# Patient Record
Sex: MALE | Race: WHITE | NOT HISPANIC OR LATINO | Employment: OTHER | URBAN - METROPOLITAN AREA
[De-identification: names, ages, dates, MRNs, and addresses within clinical notes are randomized per-mention and may not be internally consistent; named-entity substitution may affect disease eponyms.]

---

## 2018-12-26 ENCOUNTER — HOSPITAL ENCOUNTER (EMERGENCY)
Facility: CLINIC | Age: 83
Discharge: HOME | End: 2018-12-27
Attending: EMERGENCY MEDICINE | Admitting: EMERGENCY MEDICINE
Payer: MEDICARE

## 2018-12-26 DIAGNOSIS — R00.0 TACHYCARDIA: ICD-10-CM

## 2018-12-26 DIAGNOSIS — R68.83 CHILLS (WITHOUT FEVER): ICD-10-CM

## 2018-12-26 LAB — INTERPRETATION ECG - MUSE: NORMAL

## 2018-12-26 PROCEDURE — 96360 HYDRATION IV INFUSION INIT: CPT

## 2018-12-26 PROCEDURE — 93005 ELECTROCARDIOGRAM TRACING: CPT

## 2018-12-26 PROCEDURE — 99285 EMERGENCY DEPT VISIT HI MDM: CPT | Mod: 25

## 2018-12-26 ASSESSMENT — MIFFLIN-ST. JEOR: SCORE: 1492.32

## 2018-12-26 NOTE — ED AVS SNAPSHOT
Emergency Department  [address] [facility] [address]  Phone:  [phone]  Fax:  [phone]                                    Hernandez Hanson   MRN: [id number]    Department:   Emergency Department   Date of Visit:  12/26/2018           After Visit Summary Signature Page    I have received my discharge instructions, and my questions have been answered. I have discussed any challenges I see with this plan with the nurse or doctor.    ..........................................................................................................................................  Patient/Patient Representative Signature      ..........................................................................................................................................  Patient Representative Print Name and Relationship to Patient    ..................................................               ................................................  Date                                   Time    ..........................................................................................................................................  Reviewed by Signature/Title    ...................................................              ..............................................  Date                                               Time          22EPIC Rev 08/18

## 2018-12-27 ENCOUNTER — APPOINTMENT (OUTPATIENT)
Dept: GENERAL RADIOLOGY | Facility: CLINIC | Age: 83
End: 2018-12-27
Attending: EMERGENCY MEDICINE
Payer: MEDICARE

## 2018-12-27 VITALS
HEART RATE: 104 BPM | RESPIRATION RATE: 13 BRPM | SYSTOLIC BLOOD PRESSURE: 153 MMHG | TEMPERATURE: 98.8 F | HEIGHT: 67 IN | OXYGEN SATURATION: 94 % | BODY MASS INDEX: 29.19 KG/M2 | WEIGHT: 186 LBS | DIASTOLIC BLOOD PRESSURE: 89 MMHG

## 2018-12-27 LAB
ALBUMIN SERPL-MCNC: 3.5 G/DL (ref 3.4–5)
ALP SERPL-CCNC: 101 U/L (ref 40–150)
ALT SERPL W P-5'-P-CCNC: 26 U/L (ref 0–70)
ANION GAP SERPL CALCULATED.3IONS-SCNC: 9 MMOL/L (ref 3–14)
AST SERPL W P-5'-P-CCNC: 30 U/L (ref 0–45)
BASOPHILS # BLD AUTO: 0 10E9/L (ref 0–0.2)
BASOPHILS NFR BLD AUTO: 0.3 %
BILIRUB SERPL-MCNC: 1.4 MG/DL (ref 0.2–1.3)
BUN SERPL-MCNC: 11 MG/DL (ref 7–30)
CALCIUM SERPL-MCNC: 8.5 MG/DL (ref 8.5–10.1)
CHLORIDE SERPL-SCNC: 103 MMOL/L (ref 94–109)
CO2 SERPL-SCNC: 25 MMOL/L (ref 20–32)
CREAT SERPL-MCNC: 0.93 MG/DL (ref 0.66–1.25)
DIFFERENTIAL METHOD BLD: ABNORMAL
EOSINOPHIL # BLD AUTO: 0.1 10E9/L (ref 0–0.7)
EOSINOPHIL NFR BLD AUTO: 1.1 %
ERYTHROCYTE [DISTWIDTH] IN BLOOD BY AUTOMATED COUNT: 13.4 % (ref 10–15)
GFR SERPL CREATININE-BSD FRML MDRD: 75 ML/MIN/{1.73_M2}
GLUCOSE SERPL-MCNC: 98 MG/DL (ref 70–99)
HCT VFR BLD AUTO: 40.6 % (ref 40–53)
HGB BLD-MCNC: 13.9 G/DL (ref 13.3–17.7)
IMM GRANULOCYTES # BLD: 0 10E9/L (ref 0–0.4)
IMM GRANULOCYTES NFR BLD: 0.3 %
INR PPP: 1.72 (ref 0.86–1.14)
LACTATE BLD-SCNC: 0.9 MMOL/L (ref 0.7–2)
LYMPHOCYTES # BLD AUTO: 1 10E9/L (ref 0.8–5.3)
LYMPHOCYTES NFR BLD AUTO: 10.1 %
MCH RBC QN AUTO: 31.7 PG (ref 26.5–33)
MCHC RBC AUTO-ENTMCNC: 34.2 G/DL (ref 31.5–36.5)
MCV RBC AUTO: 93 FL (ref 78–100)
MONOCYTES # BLD AUTO: 0.8 10E9/L (ref 0–1.3)
MONOCYTES NFR BLD AUTO: 8.2 %
NEUTROPHILS # BLD AUTO: 7.5 10E9/L (ref 1.6–8.3)
NEUTROPHILS NFR BLD AUTO: 80 %
NRBC # BLD AUTO: 0 10*3/UL
NRBC BLD AUTO-RTO: 0 /100
PLATELET # BLD AUTO: 162 10E9/L (ref 150–450)
POTASSIUM SERPL-SCNC: 4.1 MMOL/L (ref 3.4–5.3)
PROT SERPL-MCNC: 7.2 G/DL (ref 6.8–8.8)
RBC # BLD AUTO: 4.39 10E12/L (ref 4.4–5.9)
SODIUM SERPL-SCNC: 137 MMOL/L (ref 133–144)
TROPONIN I SERPL-MCNC: <0.015 UG/L (ref 0–0.04)
TROPONIN I SERPL-MCNC: <0.015 UG/L (ref 0–0.04)
WBC # BLD AUTO: 9.4 10E9/L (ref 4–11)

## 2018-12-27 PROCEDURE — 71046 X-RAY EXAM CHEST 2 VIEWS: CPT

## 2018-12-27 PROCEDURE — 85610 PROTHROMBIN TIME: CPT | Performed by: EMERGENCY MEDICINE

## 2018-12-27 PROCEDURE — 83605 ASSAY OF LACTIC ACID: CPT | Performed by: EMERGENCY MEDICINE

## 2018-12-27 PROCEDURE — 80053 COMPREHEN METABOLIC PANEL: CPT | Performed by: EMERGENCY MEDICINE

## 2018-12-27 PROCEDURE — A9270 NON-COVERED ITEM OR SERVICE: HCPCS | Mod: GY | Performed by: EMERGENCY MEDICINE

## 2018-12-27 PROCEDURE — 84484 ASSAY OF TROPONIN QUANT: CPT | Performed by: EMERGENCY MEDICINE

## 2018-12-27 PROCEDURE — 25000128 H RX IP 250 OP 636: Performed by: EMERGENCY MEDICINE

## 2018-12-27 PROCEDURE — 96360 HYDRATION IV INFUSION INIT: CPT

## 2018-12-27 PROCEDURE — 85025 COMPLETE CBC W/AUTO DIFF WBC: CPT | Performed by: EMERGENCY MEDICINE

## 2018-12-27 PROCEDURE — 25000132 ZZH RX MED GY IP 250 OP 250 PS 637: Performed by: EMERGENCY MEDICINE

## 2018-12-27 RX ORDER — METOPROLOL TARTRATE 25 MG/1
25 TABLET, FILM COATED ORAL ONCE
Status: COMPLETED | OUTPATIENT
Start: 2018-12-27 | End: 2018-12-27

## 2018-12-27 RX ADMIN — SODIUM CHLORIDE 1000 ML: 9 INJECTION, SOLUTION INTRAVENOUS at 00:28

## 2018-12-27 RX ADMIN — METOPROLOL TARTRATE 25 MG: 25 TABLET ORAL at 01:44

## 2018-12-27 ASSESSMENT — ENCOUNTER SYMPTOMS
PALPITATIONS: 1
SHORTNESS OF BREATH: 0
FEVER: 0
COUGH: 1

## 2018-12-27 NOTE — ED PROVIDER NOTES
History     Chief Complaint:  Tachycardia     HPI   Hernandez Hanson is a 84 year old male with a history of atrial fibrillation who presents with tachycardia. The patient had no fever but has had a cough for the past couple days and decided to visit his physician this morning for evaluation and treatment. Ultimately he received a change in blood pressure medication due to a cough being a side effect. This evening, the patient started experiencing chills and noticed on his home monitor that his heart rate was in the 130s. As a result, the patient and his wide and daughter have visited the ED for evlauation and treatment. Upon arrival, the patient notes that he feels okay and that his heart rate is normal in the 60s. He denies any chest pain or shortness of breath. Of note: he is currently taking warfarin daily and is no longer on Digoxin. He has also been eating and drinking fluids normally. He has not had any tylenol or ibuprofen.    CARDIAC RISK FACTORS:  Sex:    Male   Tobacco:   Negative   Hypertension:   Positive   Hyperlipidemia:  Negative   Diabetes:   Negative   Family History:  Negative     PE/DVT RISK FACTORS:  Sex:    Male   Hormones:   Negative   Tobacco:   Negative   Cancer:   Positive (prostate cancer)  Travel:   Negative   Surgery:   Negative   Other immobilization: Negative   Personal history:  Negative   Family history:  Negative     Allergies:  The patient has no known drug allergies.    Medications:    lanocin  norco  xaltan  Benemid  flomax  coumadin    Past Medical History:    antiplatelet long term use  arrhythmia  Arthritis  Gout  Prostate cancer  Hypertension  chronic pain  Renal disease    Past Surgical History:    colonoscopy  Cystoscopy  genitourinary surgery  Laser holmium lithotripsy ureters  Orthopedic surgery     Family History:    No past pertinent family history.    Social History:  Marital Status:    Smoker:   Never   Smokeless:   Never   Alcohol:   Yes rare  "  Drugs:   No   Lives at:   Unknown   Arrives with:   Wife and daughter   Clinic:    Unknown   Work:   Unknown     Review of Systems   Constitutional: Negative for fever.   Respiratory: Positive for cough. Negative for shortness of breath.    Cardiovascular: Positive for palpitations. Negative for chest pain.   All other systems reviewed and are negative.    Physical Exam     Patient Vitals for the past 24 hrs:   BP Temp Temp src Pulse Heart Rate Resp SpO2 Height Weight   12/27/18 0302 -- -- -- -- 86 13 94 % -- --   12/27/18 0239 -- -- -- -- 87 15 93 % -- --   12/27/18 0232 -- -- -- -- 89 12 95 % -- --   12/27/18 0211 -- -- -- -- 96 21 94 % -- --   12/27/18 0202 -- -- -- -- 101 14 91 % -- --   12/27/18 0147 -- -- -- -- 93 20 -- -- --   12/27/18 0143 153/89 -- -- 104 104 24 93 % -- --   12/27/18 0110 (!) 163/95 -- -- 116 -- -- 95 % -- --   12/27/18 0045 -- -- -- -- 104 13 96 % -- --   12/27/18 0030 (!) 156/99 -- -- -- 106 20 94 % -- --   12/27/18 0015 -- -- -- -- 106 20 95 % -- --   12/27/18 0000 161/89 -- -- 100 106 26 93 % -- --   12/26/18 2314 (!) 165/96 98.8  F (37.1  C) Oral -- 116 18 96 % 1.702 m (5' 7\") 84.4 kg (186 lb)     Physical Exam  General: Appears well-developed and well-nourished.   Head: No signs of trauma.   CV: Mild tachycardia, irregularly irregular.    Resp: Effort normal and breath sounds normal. No respiratory distress.   GI: Soft. There is no tenderness.  No rebound or guarding.  Normal bowel sounds.  No CVA tenderness.  MSK: Normal range of motion. no edema. No Calf tenderness.  Neuro: The patient is alert and oriented.  Strength in upper/lower extremities normal and symmetrical.   Sensation normal. Speech normal.  GCS 15  Skin: Skin is warm and dry. No rash noted.   Psych: normal mood and affect. behavior is normal.       Emergency Department Course   ECG:  Indication: tachycardia   Time: 2314  Vent. Rate 108 bpm. AL interval *. QRS duration 92. QT/QTc 310/415. P-R-T axis * 60 33. Atrial " fibrillation. Incomplete right bundle branch block. Borderline ECG. Read time: 2350    Imaging:  Radiographic findings were communicated with the patient who voiced understanding of the findings.    XR Chest PA & LAT:   The heart size is normal. There are a few small linear  metallic foreign bodies projecting over the chest. The lungs are  clear. No pneumothorax or pleural effusion. Degenerative disease in  the thoracic spine. as per radiology.     Laboratory:  INR: 1.72  0024 Lactic acid: 0.9  CMP: bilirubin 1.4, o/w WNL (Creatinine: 0.93)  CBC: WBC: 9.4, HGB: 13.9, PLT: 162  0150 Troponin: <0.015    Interventions:  0116: NS 1L IV Bolus   0144: Metoprolol 25 mg IV     Emergency Department Course:  Nursing notes and vitals reviewed. (6962) I performed an exam of the patient as documented above.     IV inserted. Medicine administered as documented above. Blood drawn. This was sent to the lab for further testing, results above.    The patient was sent for a XR chest while in the emergency department, findings above.     (4911) I rechecked the patient and discussed the results of his workup thus far.     Findings and plan explained to the Patient and spouse and daughter. Patient discharged home with instructions regarding supportive care, medications, and reasons to return. The importance of close follow-up was reviewed.    I personally reviewed the laboratory results with the Patient and spouse and daughter and answered all related questions prior to discharge.    Impression & Plan    Medical Decision Making:  Enzo Hanson is an 84-year-old gentleman who presents due to chills and tachycardia.  Apparently he was having some chills this evening although had no other acute complaints.  He has been having a chronic cough over the last couple of months which is unchanged but no congestion, sputum, or other symptoms.  He had checked his vital signs and noted that his heart rate was elevated which made him concerned and he  came to the ER.  At the time of my evaluation, the patient was symptom-free and stated that the chills had resolved and had no other complaints.  The patient does have a history of atrial fibrillation and in the ER his heart rate was right around 100.  He had not taken his evening dose of metoprolol so I did give this.  Blood work was obtained that was non-concerning with no elevation of his white blood cell count and lactic acid.  Troponin was obtained that was negative and repeat troponin continue to be negative as well.  The patient's heart rate did normalize into the 80s which is where he has been on his last couple of clinic visits.  Given the patient being symptom-free with a negative workup, I feel it is appropriate for discharge home and outpatient management.  It is unclear what the chills were from this evening. The patient was instructed to continue supportive care measures and to follow with his primary care doctor and return for any worsening symptoms or further concerns.    Diagnosis:    ICD-10-CM    1. Chills (without fever) R68.83    2. Tachycardia R00.0        Disposition:  discharged to home    Scribe Disclosure:  I, Kin Medina, am serving as a scribe on 12/26/2018 at 11:52 PM to personally document services performed by Jayy Echeverria MD based on my observations and the provider's statements to me.     Kin Medina  12/26/2018    EMERGENCY DEPARTMENT       Jayy Echeverria MD  12/27/18 0701

## 2021-03-21 ENCOUNTER — HEALTH MAINTENANCE LETTER (OUTPATIENT)
Age: 86
End: 2021-03-21

## 2021-09-04 ENCOUNTER — HEALTH MAINTENANCE LETTER (OUTPATIENT)
Age: 86
End: 2021-09-04

## 2022-04-16 ENCOUNTER — HEALTH MAINTENANCE LETTER (OUTPATIENT)
Age: 87
End: 2022-04-16

## 2022-10-22 ENCOUNTER — HEALTH MAINTENANCE LETTER (OUTPATIENT)
Age: 87
End: 2022-10-22

## 2022-11-26 ENCOUNTER — HOSPITAL ENCOUNTER (EMERGENCY)
Facility: CLINIC | Age: 87
Discharge: HOME | End: 2022-11-26
Attending: EMERGENCY MEDICINE | Admitting: EMERGENCY MEDICINE
Payer: COMMERCIAL

## 2022-11-26 VITALS
SYSTOLIC BLOOD PRESSURE: 164 MMHG | HEIGHT: 67 IN | DIASTOLIC BLOOD PRESSURE: 88 MMHG | WEIGHT: 170 LBS | HEART RATE: 96 BPM | RESPIRATION RATE: 18 BRPM | OXYGEN SATURATION: 98 % | TEMPERATURE: 97.8 F | BODY MASS INDEX: 26.68 KG/M2

## 2022-11-26 DIAGNOSIS — R79.1 SUPRATHERAPEUTIC INR: ICD-10-CM

## 2022-11-26 DIAGNOSIS — N39.0 URINARY TRACT INFECTION WITHOUT HEMATURIA, SITE UNSPECIFIED: ICD-10-CM

## 2022-11-26 DIAGNOSIS — E86.0 DEHYDRATION: ICD-10-CM

## 2022-11-26 DIAGNOSIS — Z87.898 HISTORY OF DIARRHEA: ICD-10-CM

## 2022-11-26 LAB
ALBUMIN SERPL-MCNC: 2.9 G/DL (ref 3.4–5)
ALBUMIN UR-MCNC: 50 MG/DL
ALP SERPL-CCNC: 102 U/L (ref 40–150)
ALT SERPL W P-5'-P-CCNC: 21 U/L (ref 0–70)
ANION GAP SERPL CALCULATED.3IONS-SCNC: 7 MMOL/L (ref 3–14)
APPEARANCE UR: ABNORMAL
AST SERPL W P-5'-P-CCNC: 15 U/L (ref 0–45)
ATRIAL RATE - MUSE: NORMAL BPM
BACTERIA #/AREA URNS HPF: ABNORMAL /HPF
BASOPHILS # BLD AUTO: 0 10E3/UL (ref 0–0.2)
BASOPHILS NFR BLD AUTO: 0 %
BILIRUB SERPL-MCNC: 1.2 MG/DL (ref 0.2–1.3)
BILIRUB UR QL STRIP: NEGATIVE
BUN SERPL-MCNC: 21 MG/DL (ref 7–30)
CALCIUM SERPL-MCNC: 8.9 MG/DL (ref 8.5–10.1)
CHLORIDE BLD-SCNC: 105 MMOL/L (ref 94–109)
CO2 SERPL-SCNC: 27 MMOL/L (ref 20–32)
COLOR UR AUTO: YELLOW
CREAT SERPL-MCNC: 0.89 MG/DL (ref 0.66–1.25)
DIASTOLIC BLOOD PRESSURE - MUSE: NORMAL MMHG
EOSINOPHIL # BLD AUTO: 0 10E3/UL (ref 0–0.7)
EOSINOPHIL NFR BLD AUTO: 0 %
ERYTHROCYTE [DISTWIDTH] IN BLOOD BY AUTOMATED COUNT: 13.2 % (ref 10–15)
GFR SERPL CREATININE-BSD FRML MDRD: 82 ML/MIN/1.73M2
GLUCOSE BLD-MCNC: 88 MG/DL (ref 70–99)
GLUCOSE UR STRIP-MCNC: NEGATIVE MG/DL
HCT VFR BLD AUTO: 38.2 % (ref 40–53)
HGB BLD-MCNC: 12.9 G/DL (ref 13.3–17.7)
HGB UR QL STRIP: NEGATIVE
HYALINE CASTS: 4 /LPF
IMM GRANULOCYTES # BLD: 0.1 10E3/UL
IMM GRANULOCYTES NFR BLD: 1 %
INR PPP: 3.45 (ref 0.85–1.15)
INTERPRETATION ECG - MUSE: NORMAL
KETONES UR STRIP-MCNC: 40 MG/DL
LEUKOCYTE ESTERASE UR QL STRIP: ABNORMAL
LYMPHOCYTES # BLD AUTO: 1.2 10E3/UL (ref 0.8–5.3)
LYMPHOCYTES NFR BLD AUTO: 7 %
MCH RBC QN AUTO: 32 PG (ref 26.5–33)
MCHC RBC AUTO-ENTMCNC: 33.8 G/DL (ref 31.5–36.5)
MCV RBC AUTO: 95 FL (ref 78–100)
MONOCYTES # BLD AUTO: 1 10E3/UL (ref 0–1.3)
MONOCYTES NFR BLD AUTO: 6 %
MUCOUS THREADS #/AREA URNS LPF: PRESENT /LPF
NEUTROPHILS # BLD AUTO: 13.4 10E3/UL (ref 1.6–8.3)
NEUTROPHILS NFR BLD AUTO: 86 %
NITRATE UR QL: POSITIVE
NRBC # BLD AUTO: 0 10E3/UL
NRBC BLD AUTO-RTO: 0 /100
P AXIS - MUSE: NORMAL DEGREES
PH UR STRIP: 5.5 [PH] (ref 5–7)
PLATELET # BLD AUTO: 192 10E3/UL (ref 150–450)
POTASSIUM BLD-SCNC: 4 MMOL/L (ref 3.4–5.3)
PR INTERVAL - MUSE: NORMAL MS
PROT SERPL-MCNC: 7 G/DL (ref 6.8–8.8)
QRS DURATION - MUSE: 100 MS
QT - MUSE: 400 MS
QTC - MUSE: 456 MS
R AXIS - MUSE: 61 DEGREES
RBC # BLD AUTO: 4.03 10E6/UL (ref 4.4–5.9)
RBC URINE: 3 /HPF
SODIUM SERPL-SCNC: 139 MMOL/L (ref 133–144)
SP GR UR STRIP: 1.02 (ref 1–1.03)
SYSTOLIC BLOOD PRESSURE - MUSE: NORMAL MMHG
T AXIS - MUSE: 27 DEGREES
TROPONIN I SERPL HS-MCNC: 21 NG/L
UROBILINOGEN UR STRIP-MCNC: NORMAL MG/DL
VENTRICULAR RATE- MUSE: 78 BPM
WBC # BLD AUTO: 15.7 10E3/UL (ref 4–11)
WBC CLUMPS #/AREA URNS HPF: PRESENT /HPF
WBC URINE: >182 /HPF

## 2022-11-26 PROCEDURE — 36415 COLL VENOUS BLD VENIPUNCTURE: CPT | Performed by: EMERGENCY MEDICINE

## 2022-11-26 PROCEDURE — 93005 ELECTROCARDIOGRAM TRACING: CPT

## 2022-11-26 PROCEDURE — 81001 URINALYSIS AUTO W/SCOPE: CPT | Performed by: EMERGENCY MEDICINE

## 2022-11-26 PROCEDURE — 258N000003 HC RX IP 258 OP 636: Performed by: EMERGENCY MEDICINE

## 2022-11-26 PROCEDURE — 96361 HYDRATE IV INFUSION ADD-ON: CPT

## 2022-11-26 PROCEDURE — 80053 COMPREHEN METABOLIC PANEL: CPT | Performed by: EMERGENCY MEDICINE

## 2022-11-26 PROCEDURE — 84484 ASSAY OF TROPONIN QUANT: CPT | Performed by: EMERGENCY MEDICINE

## 2022-11-26 PROCEDURE — 250N000011 HC RX IP 250 OP 636: Performed by: EMERGENCY MEDICINE

## 2022-11-26 PROCEDURE — 85025 COMPLETE CBC W/AUTO DIFF WBC: CPT | Performed by: EMERGENCY MEDICINE

## 2022-11-26 PROCEDURE — 85610 PROTHROMBIN TIME: CPT | Performed by: EMERGENCY MEDICINE

## 2022-11-26 PROCEDURE — 96365 THER/PROPH/DIAG IV INF INIT: CPT

## 2022-11-26 PROCEDURE — 99284 EMERGENCY DEPT VISIT MOD MDM: CPT | Mod: 25

## 2022-11-26 PROCEDURE — 87086 URINE CULTURE/COLONY COUNT: CPT | Performed by: EMERGENCY MEDICINE

## 2022-11-26 RX ORDER — CEPHALEXIN 500 MG/1
500 CAPSULE ORAL 3 TIMES DAILY
Qty: 21 CAPSULE | Refills: 0 | Status: SHIPPED | OUTPATIENT
Start: 2022-11-26 | End: 2022-12-03

## 2022-11-26 RX ORDER — CEFTRIAXONE 1 G/1
1 INJECTION, POWDER, FOR SOLUTION INTRAMUSCULAR; INTRAVENOUS ONCE
Status: COMPLETED | OUTPATIENT
Start: 2022-11-26 | End: 2022-11-26

## 2022-11-26 RX ADMIN — SODIUM CHLORIDE 1000 ML: 9 INJECTION, SOLUTION INTRAVENOUS at 17:43

## 2022-11-26 RX ADMIN — SODIUM CHLORIDE 500 ML: 9 INJECTION, SOLUTION INTRAVENOUS at 12:51

## 2022-11-26 RX ADMIN — CEFTRIAXONE SODIUM 1 G: 1 INJECTION, POWDER, FOR SOLUTION INTRAMUSCULAR; INTRAVENOUS at 17:43

## 2022-11-26 ASSESSMENT — ACTIVITIES OF DAILY LIVING (ADL)
ADLS_ACUITY_SCORE: 33
ADLS_ACUITY_SCORE: 35

## 2022-11-26 NOTE — ED TRIAGE NOTES
Reports a couple days of loose stools and urinary incontinence.  States burning when pees.  Feels dehydrated.  On coumadin.      Triage Assessment     Row Name 11/26/22 1238       Triage Assessment (Adult)    Airway WDL WDL       Respiratory WDL    Respiratory WDL WDL       Skin Circulation/Temperature WDL    Skin Circulation/Temperature WDL WDL       Cardiac WDL    Cardiac WDL WDL       Peripheral/Neurovascular WDL    Peripheral Neurovascular WDL WDL       Cognitive/Neuro/Behavioral WDL    Cognitive/Neuro/Behavioral WDL WDL

## 2022-11-26 NOTE — ED PROVIDER NOTES
History   Chief Complaint:  Diarrhea         HPI   Hernandez Hanson is a 88 year old male who presents for evaluation of 2-day history of loose stools and urinary incontinence.  Patient states that he is having burning with urination.  He feels like he is dehydrated.  He denies nausea vomiting but has experienced diarrhea.  The diarrhea is accompanied by incontinence.  He denies fever or chills.  He denies abdominal pain.  He denies chest pain or shortness of breath.    Allergies:  No Known Allergies    Medications:   Digoxin (LANOXIN PO)  HYDROcodone-acetaminophen (NORCO) 5-325 MG per tablet  latanoprost (XALATAN) 0.005 % ophthalmic solution  METOPROLOL TARTRATE PO  probenecid (BENEMID) 500 MG tablet  tamsulosin (FLOMAX) 0.4 MG 24 hr capsule  Warfarin Sodium (COUMADIN PO)        Past Medical History:    Past Medical History:   Diagnosis Date     Antiplatelet or antithrombotic long-term use      Arrhythmia      Arthritis      Gout      H/O prostate cancer      Hypertension      Other chronic pain      Renal disease      There is no problem list on file for this patient.       Past Surgical History:    Past Surgical History:   Procedure Laterality Date     COLONOSCOPY       CYSTOSCOPY, RETROGRADES, COMBINED  4/15/2014    Procedure: COMBINED CYSTOSCOPY, RETROGRADES;  Surgeon: Josue Greene MD;  Location:  OR     GENITOURINARY SURGERY      BRACHY THERAPY     LASER HOLMIUM LITHOTRIPSY URETER(S), INSERT STENT, COMBINED  4/15/2014    Procedure: CYSTOSCOPY, LEFT RETROGRADE, LEFT URETEROSCOPY, HOLMUIM LASER LITHOTRIPSY, LEFT STENT, URETHRAL STRICTURE DILATION;  Surgeon: Josue Greene MD;  Location:  OR     ORTHOPEDIC SURGERY      R SHAHIDA        Family History:    Noncontributory    Social History:  PCP: EL GILMORE  Presents to the ED with his wife  Social History     Tobacco Use     Smoking status: Never     Smokeless tobacco: Never   Substance Use Topics     Alcohol use: Yes     Comment: rare     Drug  "use: No       Review of Systems  See the HPI, otherwise the rest of the ROS is negative.      Physical Exam     Patient Vitals for the past 24 hrs:   BP Temp Temp src Pulse Resp SpO2 Height Weight   11/26/22 1900 (!) 164/88 -- -- 96 -- -- -- --   11/26/22 1815 (!) 156/97 -- -- 89 -- -- -- --   11/26/22 1800 (!) 144/77 -- -- 86 -- -- -- --   11/26/22 1745 (!) 148/97 -- -- 90 -- -- -- --   11/26/22 1740 (!) 144/81 -- -- 90 -- -- -- --   11/26/22 1238 (!) 145/66 97.8  F (36.6  C) Temporal 73 18 98 % 1.702 m (5' 7\") 77.1 kg (170 lb)       Physical Exam  General: Alert, No distress. Nontoxic appearance  Head: No signs of trauma.   Mouth/Throat: Oropharynx moist.   Eyes: Conjunctivae are normal. Pupils are equal..   Neck: Normal range of motion.    CV: Appears well perfused.  Resp:No respiratory distress.  Abdomen: soft nontender nondistended  MSK: Normal range of motion. No obvious deformity.   Neuro: The patient is alert and interactive. SANDERSON. Speech normal. GCS 15  Skin: No lesion or sign of trauma noted.   Psych: normal mood and affect. behavior is normal.       Emergency Department Course     ECG  ECG taken at 1254, ECG read at 1714  Atrial fibrillation   Incomplete right bundle branch block   Abnormal ECG  Rate 78 bpm. UT interval * ms. QRS duration 100 ms. QT/QTc 400/456 ms. P-R-T axes * 61 27.     Imaging:  No orders to display      Laboratory:  Labs Ordered and Resulted from Time of ED Arrival to Time of ED Departure   COMPREHENSIVE METABOLIC PANEL - Abnormal       Result Value    Sodium 139      Potassium 4.0      Chloride 105      Carbon Dioxide (CO2) 27      Anion Gap 7      Urea Nitrogen 21      Creatinine 0.89      Calcium 8.9      Glucose 88      Alkaline Phosphatase 102      AST 15      ALT 21      Protein Total 7.0      Albumin 2.9 (*)     Bilirubin Total 1.2      GFR Estimate 82     INR - Abnormal    INR 3.45 (*)    UA MACROSCOPIC WITH REFLEX TO MICRO AND CULTURE - Abnormal    Color Urine Yellow      " Appearance Urine Slightly Cloudy (*)     Glucose Urine Negative      Bilirubin Urine Negative      Ketones Urine 40 (*)     Specific Gravity Urine 1.022      Blood Urine Negative      pH Urine 5.5      Protein Albumin Urine 50 (*)     Urobilinogen Urine Normal      Nitrite Urine Positive (*)     Leukocyte Esterase Urine Large (*)     Bacteria Urine Many (*)     WBC Clumps Urine Present (*)     Mucus Urine Present (*)     RBC Urine 3 (*)     WBC Urine >182 (*)     Hyaline Casts Urine 4 (*)    CBC WITH PLATELETS AND DIFFERENTIAL - Abnormal    WBC Count 15.7 (*)     RBC Count 4.03 (*)     Hemoglobin 12.9 (*)     Hematocrit 38.2 (*)     MCV 95      MCH 32.0      MCHC 33.8      RDW 13.2      Platelet Count 192      % Neutrophils 86      % Lymphocytes 7      % Monocytes 6      % Eosinophils 0      % Basophils 0      % Immature Granulocytes 1      NRBCs per 100 WBC 0      Absolute Neutrophils 13.4 (*)     Absolute Lymphocytes 1.2      Absolute Monocytes 1.0      Absolute Eosinophils 0.0      Absolute Basophils 0.0      Absolute Immature Granulocytes 0.1      Absolute NRBCs 0.0     TROPONIN I - Normal    Troponin I High Sensitivity 21     URINE CULTURE       Interventions:  Medications   0.9% sodium chloride BOLUS (0 mLs Intravenous Stopped 11/26/22 1527)   cefTRIAXone (ROCEPHIN) 1 g vial to attach to  mL bag for ADULTS or NS 50 mL bag for PEDS (0 g Intravenous Stopped 11/26/22 1908)   0.9% sodium chloride BOLUS (0 mLs Intravenous Stopped 11/26/22 1908)       Emergency Department Course/Medical Decision Making:  This patient is presenting to the ED with UTI symptoms.  Urinalysis indicates UTI.  Urine culture is pending.  The patient was given IV ceftriaxone in the ED.  He will be discharged with p.o. Keflex.  He has no signs of systemic illness except for history of recent diarrhea.  He was unable to produce a stool sample while in the ED.  He clinically appears dehydrated.  He received 1 L normal saline fluid bolus  and is feeling better.  His INR is slightly supratherapeutic.  He will need to follow-up with his primary care doctor regarding this but is not having uncontrolled bleeding.    Diagnosis:    ICD-10-CM    1. Urinary tract infection without hematuria, site unspecified  N39.0       2. Dehydration  E86.0       3. History of diarrhea  Z87.898       4. Supratherapeutic INR  R79.1           Disposition:  Discharged to home.    Discharge Medications:  Discharge Medication List as of 11/26/2022  7:08 PM      START taking these medications    Details   cephALEXin (KEFLEX) 500 MG capsule Take 1 capsule (500 mg) by mouth 3 times daily for 7 days, Disp-21 capsule, R-0, Local Print              Bari Boateng MD  11/27/22 5909

## 2022-11-27 LAB — BACTERIA UR CULT: ABNORMAL

## 2022-11-28 NOTE — RESULT ENCOUNTER NOTE
Final Urine Culture Report on 11/27  [facility] Emergency Dept discharge antibiotic prescribed: Cephalexin (Keflex) 500 mg capsule, 1 capsule (500 mg) by mouth 3 times daily for 7 days.  #1. Bacteria, >100,000 CFU/ML Escherichia coli , is SUSCEPTIBLE to Antibiotic.    No change in treatment per [facility] ED lab result Urine Culture protocol.

## 2022-12-05 ENCOUNTER — HOSPITAL ENCOUNTER (EMERGENCY)
Facility: CLINIC | Age: 87
Discharge: HOME | End: 2022-12-05
Attending: EMERGENCY MEDICINE | Admitting: EMERGENCY MEDICINE
Payer: COMMERCIAL

## 2022-12-05 VITALS
HEIGHT: 67 IN | HEART RATE: 78 BPM | SYSTOLIC BLOOD PRESSURE: 184 MMHG | BODY MASS INDEX: 27.4 KG/M2 | DIASTOLIC BLOOD PRESSURE: 88 MMHG | WEIGHT: 174.6 LBS | RESPIRATION RATE: 16 BRPM | TEMPERATURE: 97.7 F | OXYGEN SATURATION: 96 %

## 2022-12-05 DIAGNOSIS — I10 HYPERTENSION, UNSPECIFIED TYPE: ICD-10-CM

## 2022-12-05 PROCEDURE — 93005 ELECTROCARDIOGRAM TRACING: CPT

## 2022-12-05 PROCEDURE — 99283 EMERGENCY DEPT VISIT LOW MDM: CPT

## 2022-12-05 RX ORDER — HYDRALAZINE HYDROCHLORIDE 25 MG/1
25 TABLET, FILM COATED ORAL 3 TIMES DAILY
Refills: 0
Start: 2022-12-05

## 2022-12-06 NOTE — ED PROVIDER NOTES
History     Chief Complaint:  Hypertension       HPI   Hernandez Hanson is a 88 year old male who presents with concerns over high blood pressure.  Was seen recently diagnosed with a UTI, had his antibiotic changed to ciprofloxacin due to some fluctuations in his INR.  He has been checking his blood pressure more lately and noticing numbers greater than 180 systolic.  Very concerned by that.  Denies fever headache chest pain or shortness of breath symptoms..    ROS:  Review of Systems   All other systems reviewed and are negative.         Allergies:  No Known Allergies     Medications:    hydrALAZINE (APRESOLINE) 25 MG tablet  Digoxin (LANOXIN PO)  HYDROcodone-acetaminophen (NORCO) 5-325 MG per tablet  latanoprost (XALATAN) 0.005 % ophthalmic solution  METOPROLOL TARTRATE PO  probenecid (BENEMID) 500 MG tablet  tamsulosin (FLOMAX) 0.4 MG 24 hr capsule  Warfarin Sodium (COUMADIN PO)        Past Medical History:    Past Medical History:   Diagnosis Date     Antiplatelet or antithrombotic long-term use      Arrhythmia      Arthritis      Gout      H/O prostate cancer      Hypertension      Other chronic pain      Renal disease        Past Surgical History:    Past Surgical History:   Procedure Laterality Date     COLONOSCOPY       CYSTOSCOPY, RETROGRADES, COMBINED  4/15/2014    Procedure: COMBINED CYSTOSCOPY, RETROGRADES;  Surgeon: Josue Greene MD;  Location:  OR     GENITOURINARY SURGERY      BRACHY THERAPY     LASER HOLMIUM LITHOTRIPSY URETER(S), INSERT STENT, COMBINED  4/15/2014    Procedure: CYSTOSCOPY, LEFT RETROGRADE, LEFT URETEROSCOPY, HOLMUIM LASER LITHOTRIPSY, LEFT STENT, URETHRAL STRICTURE DILATION;  Surgeon: Josue Greene MD;  Location:  OR     ORTHOPEDIC SURGERY      R SHAHIDA        Family History:    family history is not on file.    Social History:   reports that he has never smoked. He has never used smokeless tobacco. He reports current alcohol use. He reports that he does not use  "drugs.  PCP: Nilda Borges     Physical Exam     Patient Vitals for the past 24 hrs:   BP Temp Temp src Pulse Resp SpO2 Height Weight   12/05/22 2319 (!) 184/88 97.7  F (36.5  C) -- 78 -- 96 % -- --   12/05/22 2313 -- 98.3  F (36.8  C) Temporal 91 16 95 % 1.702 m (5' 7\") 79.2 kg (174 lb 9.6 oz)        Physical Exam  Gen: well appearing, in no acute distress  Oral : Mucous membranes moist,   Nose: No rhinorhea  Ears: External near normal, without drainage  Eyes: periorbital tissues and sclera normal   Neck: supple, no abnormal swelling  Lungs:  no tachypnea or distress, speaks full sentences  CV: Regular rate  Ext: no lower extremity edema  Skin: warm, dry, well perfused, no rashes/bruising/lesions on exposed skin  Neuro: alert, no gross motor or sensory deficits,   Psych: pleasant mood, normal affect      Emergency Department Course   ECG:  ECG results from 11/26/22   EKG 12 lead     Value    Systolic Blood Pressure     Diastolic Blood Pressure     Ventricular Rate 78    Atrial Rate     IA Interval     QRS Duration 100        QTc 456    P Axis     R AXIS 61    T Axis 27    Interpretation ECG      Atrial fibrillation  Incomplete right bundle branch block  Abnormal ECG  When compared with ECG of 26-DEC-2018 23:14,  Previous ECG has undetermined rhythm, needs review  Confirmed by GENERATED REPORT, COMPUTER ([id number]),  Tj Swan ([id number]) on 11/26/2022 12:58:05 PM         Imaging:  No orders to display          Laboratory:  Labs Ordered and Resulted from Time of ED Arrival to Time of ED Departure - No data to display     Procedures       Emergency Department Course:             Reviewed:  I reviewed nursing notes, vitals and past medical history    Assessments:      Consults:       Interventions:  Medications - No data to display     Disposition:  The patient was discharged to home.     Impression & Plan    CMS Diagnoses:   and None          Medical Decision Making:  Patient presents emergency department " with concerns about his high blood pressure.  He is asymptomatic.  Very concerned about systolics greater than 180.  We will go ahead and get him permission to take an extra tablet of hydralazine the day if he notices systolic blood pressure greater than 180.  He is not checking at excessive amount, will encourage follow-up with PCP for recheck.  Reviewed his labs from about 7 days ago and he has normal creatinine.  No evidence of hypertensive emergency        Diagnosis:    ICD-10-CM    1. Hypertension, unspecified type  I10            Discharge Medications:  New Prescriptions    HYDRALAZINE (APRESOLINE) 25 MG TABLET    Take 1 tablet (25 mg) by mouth 3 times daily        12/5/2022   Malick Pulliam,*        Malick Pulliam MD  12/05/22 4295

## 2022-12-06 NOTE — ED TRIAGE NOTES
Pt is concerned of high blood pressure for the last couple of days. Pt states he has been checking his blood pressure more frequently in the last couple of days and noted increase . Today blood pressure were higher than normal. Pt denies any headache, no dizziness, no vision changes. Pt currently taking hydralazine 25 mg BID and Metoprolol. Pt took his 2000 hydralazine 25 mg.         Pt has UTI and completed 7 days of keflex and increased 5 more days of Cipro.

## 2023-06-01 ENCOUNTER — HEALTH MAINTENANCE LETTER (OUTPATIENT)
Age: 88
End: 2023-06-01

## 2024-06-02 ENCOUNTER — HEALTH MAINTENANCE LETTER (OUTPATIENT)
Age: 89
End: 2024-06-02

## 2025-06-14 ENCOUNTER — HEALTH MAINTENANCE LETTER (OUTPATIENT)
Age: OVER 89
End: 2025-06-14